# Patient Record
Sex: FEMALE | Race: WHITE | NOT HISPANIC OR LATINO
[De-identification: names, ages, dates, MRNs, and addresses within clinical notes are randomized per-mention and may not be internally consistent; named-entity substitution may affect disease eponyms.]

---

## 2017-11-16 ENCOUNTER — HOSPITAL (OUTPATIENT)
Dept: OTHER | Age: 62
End: 2017-11-16

## 2017-11-16 ENCOUNTER — IMAGING SERVICES (OUTPATIENT)
Dept: OTHER | Age: 62
End: 2017-11-16

## 2017-12-30 ENCOUNTER — HOSPITAL (OUTPATIENT)
Dept: OTHER | Age: 62
End: 2017-12-30
Attending: ORTHOPAEDIC SURGERY

## 2017-12-30 LAB
ANION GAP SERPL CALC-SCNC: 11 MMOL/L (ref 10–20)
BUN SERPL-MCNC: 14 MG/DL (ref 6–20)
BUN/CREAT SERPL: 16 (ref 7–25)
CALCIUM SERPL-MCNC: 9 MG/DL (ref 8.4–10.2)
CHLORIDE: 105 MMOL/L (ref 98–107)
CO2 SERPL-SCNC: 27 MMOL/L (ref 21–32)
CREAT SERPL-MCNC: 0.88 MG/DL (ref 0.51–0.95)
GLUCOSE SERPL-MCNC: 109 MG/DL (ref 65–99)
POTASSIUM SERPL-SCNC: 4 MMOL/L (ref 3.4–5.1)
SODIUM SERPL-SCNC: 139 MMOL/L (ref 135–145)

## 2017-12-31 ENCOUNTER — DIAGNOSTIC TRANS (OUTPATIENT)
Dept: OTHER | Age: 62
End: 2017-12-31

## 2018-01-04 ENCOUNTER — HOSPITAL (OUTPATIENT)
Dept: OTHER | Age: 63
End: 2018-01-04
Attending: ORTHOPAEDIC SURGERY

## 2018-01-04 ENCOUNTER — CHARTING TRANS (OUTPATIENT)
Dept: OTHER | Age: 63
End: 2018-01-04

## 2018-01-24 ENCOUNTER — HOSPITAL (OUTPATIENT)
Dept: OTHER | Age: 63
End: 2018-01-24
Attending: PHYSICIAN ASSISTANT

## 2018-02-01 ENCOUNTER — HOSPITAL (OUTPATIENT)
Dept: OTHER | Age: 63
End: 2018-02-01
Attending: PHYSICIAN ASSISTANT

## 2019-03-19 PROBLEM — G47.30 SLEEP APNEA, UNSPECIFIED TYPE: Status: ACTIVE | Noted: 2019-01-23

## 2019-03-19 PROBLEM — E78.5 HYPERLIPIDEMIA, UNSPECIFIED HYPERLIPIDEMIA TYPE: Status: ACTIVE | Noted: 2018-10-24

## 2019-03-19 PROBLEM — F17.200 SMOKER: Status: ACTIVE | Noted: 2019-01-23

## 2019-03-19 PROBLEM — I10 HYPERTENSION, UNSPECIFIED TYPE: Status: ACTIVE | Noted: 2018-10-24

## 2019-04-08 PROBLEM — H65.91 RIGHT OTITIS MEDIA WITH EFFUSION: Status: ACTIVE | Noted: 2019-04-08

## 2019-04-08 PROBLEM — H93.299 ABNORMAL AUDITORY PERCEPTION, UNSPECIFIED LATERALITY: Status: ACTIVE | Noted: 2019-04-08

## 2019-05-03 RX ORDER — AMOXICILLIN 500 MG/1
500 TABLET, FILM COATED ORAL 3 TIMES DAILY
Status: ON HOLD | COMMUNITY
End: 2019-05-10 | Stop reason: ALTCHOICE

## 2019-05-09 ENCOUNTER — HOSPITAL ENCOUNTER (OUTPATIENT)
Dept: CT IMAGING | Facility: HOSPITAL | Age: 64
Discharge: HOME | End: 2019-05-09
Attending: INTERNAL MEDICINE
Payer: COMMERCIAL

## 2019-05-09 ENCOUNTER — APPOINTMENT (OUTPATIENT)
Dept: LAB | Facility: HOSPITAL | Age: 64
End: 2019-05-09
Attending: RADIOLOGY
Payer: COMMERCIAL

## 2019-05-09 ENCOUNTER — HOSPITAL ENCOUNTER (OUTPATIENT)
Dept: GENERAL RADIOLOGY | Facility: HOSPITAL | Age: 64
Discharge: HOME | End: 2019-05-09
Attending: RADIOLOGY
Payer: COMMERCIAL

## 2019-05-09 VITALS
OXYGEN SATURATION: 93 % | SYSTOLIC BLOOD PRESSURE: 154 MMHG | DIASTOLIC BLOOD PRESSURE: 52 MMHG | BODY MASS INDEX: 33.13 KG/M2 | WEIGHT: 180 LBS | HEIGHT: 62 IN | HEART RATE: 55 BPM | TEMPERATURE: 98 F | RESPIRATION RATE: 16 BRPM

## 2019-05-09 DIAGNOSIS — R91.8 LUNG MASS: ICD-10-CM

## 2019-05-09 DIAGNOSIS — R91.8 LUNG MASS: Primary | ICD-10-CM

## 2019-05-09 PROCEDURE — 88342 IMHCHEM/IMCYTCHM 1ST ANTB: CPT | Performed by: INTERNAL MEDICINE

## 2019-05-09 PROCEDURE — 88305 TISSUE EXAM BY PATHOLOGIST: CPT | Performed by: INTERNAL MEDICINE

## 2019-05-09 PROCEDURE — 77012 CT SCAN FOR NEEDLE BIOPSY: CPT | Performed by: INTERNAL MEDICINE

## 2019-05-09 PROCEDURE — 36415 COLL VENOUS BLD VENIPUNCTURE: CPT

## 2019-05-09 PROCEDURE — 88341 IMHCHEM/IMCYTCHM EA ADD ANTB: CPT | Performed by: INTERNAL MEDICINE

## 2019-05-09 PROCEDURE — 32405 CT BIOPSY LUNG OR MEDIASTINUM (CPT=77012/32405): CPT | Performed by: INTERNAL MEDICINE

## 2019-05-09 PROCEDURE — 85027 COMPLETE CBC AUTOMATED: CPT

## 2019-05-09 PROCEDURE — 99152 MOD SED SAME PHYS/QHP 5/>YRS: CPT | Performed by: INTERNAL MEDICINE

## 2019-05-09 PROCEDURE — 85610 PROTHROMBIN TIME: CPT

## 2019-05-09 PROCEDURE — 71045 X-RAY EXAM CHEST 1 VIEW: CPT | Performed by: RADIOLOGY

## 2019-05-09 RX ORDER — NALOXONE HYDROCHLORIDE 0.4 MG/ML
80 INJECTION, SOLUTION INTRAMUSCULAR; INTRAVENOUS; SUBCUTANEOUS AS NEEDED
Status: DISCONTINUED | OUTPATIENT
Start: 2019-05-09 | End: 2019-05-11

## 2019-05-09 RX ORDER — FLUMAZENIL 0.1 MG/ML
0.2 INJECTION, SOLUTION INTRAVENOUS AS NEEDED
Status: DISCONTINUED | OUTPATIENT
Start: 2019-05-09 | End: 2019-05-11

## 2019-05-09 RX ORDER — MIDAZOLAM HYDROCHLORIDE 1 MG/ML
INJECTION INTRAMUSCULAR; INTRAVENOUS
Status: COMPLETED
Start: 2019-05-09 | End: 2019-05-09

## 2019-05-09 RX ORDER — SODIUM CHLORIDE 9 MG/ML
INJECTION, SOLUTION INTRAVENOUS CONTINUOUS
Status: DISCONTINUED | OUTPATIENT
Start: 2019-05-09 | End: 2019-05-11

## 2019-05-09 RX ORDER — MIDAZOLAM HYDROCHLORIDE 1 MG/ML
1 INJECTION INTRAMUSCULAR; INTRAVENOUS EVERY 5 MIN PRN
Status: ACTIVE | OUTPATIENT
Start: 2019-05-09 | End: 2019-05-09

## 2019-05-09 RX ADMIN — SODIUM CHLORIDE: 9 INJECTION, SOLUTION INTRAVENOUS at 08:05:00

## 2019-05-09 RX ADMIN — MIDAZOLAM HYDROCHLORIDE 1 MG: 1 INJECTION INTRAMUSCULAR; INTRAVENOUS at 08:21:00

## 2019-05-09 RX ADMIN — MIDAZOLAM HYDROCHLORIDE 0.5 MG: 1 INJECTION INTRAMUSCULAR; INTRAVENOUS at 08:25:00

## 2019-05-09 NOTE — OR NURSING
Dr. Christina Lange updated on patients xray results. Notified Dr. Christina Lange patient feels ok, but patient remains on 2L O2 at this time, O2 sats 91-92%. Per Dr. Christina Lange, patient to be weaned off oxygen, and then update Dr. Christina Lange and will decide on plan at that time.  Patient updated,

## 2019-05-09 NOTE — IMAGING NOTE
Post lung bx with Dr. Yue Pham. Tolerated procedure and sedation well. Coughing immediately after with small amt of blood noted. Dr. Yue Pham ordered pt to be prone on cart immediately after dressing bx site with tegaderm. Teg CDI. Pt positioned prone.  Alert and orien

## 2019-05-10 ENCOUNTER — APPOINTMENT (OUTPATIENT)
Dept: GENERAL RADIOLOGY | Facility: HOSPITAL | Age: 64
DRG: 201 | End: 2019-05-10
Attending: RADIOLOGY
Payer: COMMERCIAL

## 2019-05-10 ENCOUNTER — HOSPITAL ENCOUNTER (OUTPATIENT)
Dept: GENERAL RADIOLOGY | Facility: HOSPITAL | Age: 64
Discharge: HOME | DRG: 201 | End: 2019-05-10
Attending: RADIOLOGY
Payer: COMMERCIAL

## 2019-05-10 ENCOUNTER — HOSPITAL ENCOUNTER (INPATIENT)
Dept: CT IMAGING | Facility: HOSPITAL | Age: 64
LOS: 1 days | Discharge: HOME | DRG: 201 | End: 2019-05-11
Attending: INTERNAL MEDICINE | Admitting: INTERNAL MEDICINE
Payer: COMMERCIAL

## 2019-05-10 DIAGNOSIS — R91.8 MASS OF LUNG: ICD-10-CM

## 2019-05-10 DIAGNOSIS — J95.811 POSTPROCEDURAL PNEUMOTHORAX: ICD-10-CM

## 2019-05-10 DIAGNOSIS — R91.8 LUNG MASS: ICD-10-CM

## 2019-05-10 PROBLEM — J93.9 PNEUMOTHORAX: Status: ACTIVE | Noted: 2019-05-10

## 2019-05-10 PROCEDURE — 32557 INSERT CATH PLEURA W/ IMAGE: CPT | Performed by: INTERNAL MEDICINE

## 2019-05-10 PROCEDURE — 0W9930Z DRAINAGE OF RIGHT PLEURAL CAVITY WITH DRAINAGE DEVICE, PERCUTANEOUS APPROACH: ICD-10-PCS | Performed by: INTERNAL MEDICINE

## 2019-05-10 PROCEDURE — 94660 CPAP INITIATION&MGMT: CPT

## 2019-05-10 PROCEDURE — 99152 MOD SED SAME PHYS/QHP 5/>YRS: CPT | Performed by: INTERNAL MEDICINE

## 2019-05-10 PROCEDURE — 71046 X-RAY EXAM CHEST 2 VIEWS: CPT | Performed by: RADIOLOGY

## 2019-05-10 PROCEDURE — 71045 X-RAY EXAM CHEST 1 VIEW: CPT | Performed by: RADIOLOGY

## 2019-05-10 PROCEDURE — 5A09357 ASSISTANCE WITH RESPIRATORY VENTILATION, LESS THAN 24 CONSECUTIVE HOURS, CONTINUOUS POSITIVE AIRWAY PRESSURE: ICD-10-PCS | Performed by: RADIOLOGY

## 2019-05-10 RX ORDER — LOSARTAN POTASSIUM 100 MG/1
100 TABLET ORAL DAILY
Status: DISCONTINUED | OUTPATIENT
Start: 2019-05-10 | End: 2019-05-10

## 2019-05-10 RX ORDER — FLUTICASONE PROPIONATE 50 MCG
2 SPRAY, SUSPENSION (ML) NASAL DAILY
Status: DISCONTINUED | OUTPATIENT
Start: 2019-05-10 | End: 2019-05-11

## 2019-05-10 RX ORDER — NALOXONE HYDROCHLORIDE 0.4 MG/ML
80 INJECTION, SOLUTION INTRAMUSCULAR; INTRAVENOUS; SUBCUTANEOUS AS NEEDED
Status: DISCONTINUED | OUTPATIENT
Start: 2019-05-10 | End: 2019-05-10 | Stop reason: HOSPADM

## 2019-05-10 RX ORDER — AMLODIPINE BESYLATE 5 MG/1
10 TABLET ORAL DAILY
Status: DISCONTINUED | OUTPATIENT
Start: 2019-05-11 | End: 2019-05-11

## 2019-05-10 RX ORDER — ACETAMINOPHEN 325 MG/1
650 TABLET ORAL EVERY 6 HOURS PRN
Status: DISCONTINUED | OUTPATIENT
Start: 2019-05-10 | End: 2019-05-11

## 2019-05-10 RX ORDER — FLUMAZENIL 0.1 MG/ML
0.2 INJECTION, SOLUTION INTRAVENOUS AS NEEDED
Status: DISCONTINUED | OUTPATIENT
Start: 2019-05-10 | End: 2019-05-10 | Stop reason: HOSPADM

## 2019-05-10 RX ORDER — HYDROCODONE BITARTRATE AND ACETAMINOPHEN 10; 325 MG/1; MG/1
2 TABLET ORAL EVERY 4 HOURS PRN
Status: DISCONTINUED | OUTPATIENT
Start: 2019-05-10 | End: 2019-05-11

## 2019-05-10 RX ORDER — HYDROMORPHONE HYDROCHLORIDE 1 MG/ML
INJECTION, SOLUTION INTRAMUSCULAR; INTRAVENOUS; SUBCUTANEOUS EVERY 2 HOUR PRN
Status: DISCONTINUED | OUTPATIENT
Start: 2019-05-10 | End: 2019-05-11

## 2019-05-10 RX ORDER — SODIUM CHLORIDE 9 MG/ML
INJECTION, SOLUTION INTRAVENOUS CONTINUOUS
Status: DISCONTINUED | OUTPATIENT
Start: 2019-05-10 | End: 2019-05-10 | Stop reason: HOSPADM

## 2019-05-10 RX ORDER — ATORVASTATIN CALCIUM 20 MG/1
20 TABLET, FILM COATED ORAL NIGHTLY
Status: DISCONTINUED | OUTPATIENT
Start: 2019-05-10 | End: 2019-05-11

## 2019-05-10 RX ORDER — METOPROLOL TARTRATE 50 MG/1
50 TABLET, FILM COATED ORAL
Status: DISCONTINUED | OUTPATIENT
Start: 2019-05-10 | End: 2019-05-11

## 2019-05-10 RX ORDER — METOCLOPRAMIDE HYDROCHLORIDE 5 MG/ML
10 INJECTION INTRAMUSCULAR; INTRAVENOUS EVERY 8 HOURS PRN
Status: DISCONTINUED | OUTPATIENT
Start: 2019-05-10 | End: 2019-05-11

## 2019-05-10 RX ORDER — POLYETHYLENE GLYCOL 3350 17 G/17G
17 POWDER, FOR SOLUTION ORAL DAILY PRN
Status: DISCONTINUED | OUTPATIENT
Start: 2019-05-10 | End: 2019-05-11

## 2019-05-10 RX ORDER — SODIUM PHOSPHATE, DIBASIC AND SODIUM PHOSPHATE, MONOBASIC 7; 19 G/133ML; G/133ML
1 ENEMA RECTAL ONCE AS NEEDED
Status: DISCONTINUED | OUTPATIENT
Start: 2019-05-10 | End: 2019-05-11

## 2019-05-10 RX ORDER — AMLODIPINE BESYLATE 5 MG/1
10 TABLET ORAL DAILY
Status: DISCONTINUED | OUTPATIENT
Start: 2019-05-10 | End: 2019-05-10

## 2019-05-10 RX ORDER — BISACODYL 10 MG
10 SUPPOSITORY, RECTAL RECTAL
Status: DISCONTINUED | OUTPATIENT
Start: 2019-05-10 | End: 2019-05-11

## 2019-05-10 RX ORDER — HYDROCODONE BITARTRATE AND ACETAMINOPHEN 10; 325 MG/1; MG/1
1 TABLET ORAL EVERY 4 HOURS PRN
Status: DISCONTINUED | OUTPATIENT
Start: 2019-05-10 | End: 2019-05-11

## 2019-05-10 RX ORDER — LOSARTAN POTASSIUM 100 MG/1
100 TABLET ORAL DAILY
Status: DISCONTINUED | OUTPATIENT
Start: 2019-05-11 | End: 2019-05-11

## 2019-05-10 RX ORDER — MIDAZOLAM HYDROCHLORIDE 1 MG/ML
1 INJECTION INTRAMUSCULAR; INTRAVENOUS EVERY 5 MIN PRN
Status: DISCONTINUED | OUTPATIENT
Start: 2019-05-10 | End: 2019-05-10 | Stop reason: HOSPADM

## 2019-05-10 RX ORDER — ONDANSETRON 2 MG/ML
4 INJECTION INTRAMUSCULAR; INTRAVENOUS EVERY 6 HOURS PRN
Status: DISCONTINUED | OUTPATIENT
Start: 2019-05-10 | End: 2019-05-11

## 2019-05-10 RX ADMIN — FLUTICASONE PROPIONATE 2 SPRAY: 50 MCG SPRAY, SUSPENSION (ML) NASAL at 20:23:00

## 2019-05-10 RX ADMIN — ATORVASTATIN CALCIUM 20 MG: 20 TABLET, FILM COATED ORAL at 20:22:00

## 2019-05-10 RX ADMIN — HYDROMORPHONE HYDROCHLORIDE 1 MG: 1 INJECTION, SOLUTION INTRAMUSCULAR; INTRAVENOUS; SUBCUTANEOUS at 10:29:00

## 2019-05-10 RX ADMIN — HYDROCODONE BITARTRATE AND ACETAMINOPHEN 2 TABLET: 10; 325 TABLET ORAL at 20:23:00

## 2019-05-10 NOTE — H&P
[facility] Hospitalist H&P       CC: direct admission for R PTX with chest tube    PCP: Rochelle Jackson MD    History of Present Illness:   Pt seen earlier, note delayed.     Pt is a 60 yo with HTN/HL, ALEJANDRO, incidental lung nodule s/p bx who is admitted fo No wheezing, Normal effort   Chest wall:  No tenderness or deformity. Heart:  Regular rate and rhythm, S1, S2 normal,no LE edema   Abdomen:   Soft, non-tender.  Bowel sounds normal. . Non distended, no overt hernias   Extremities: Extremities normal, at

## 2019-05-10 NOTE — CONSULTS
Pulmonary H&P/Consult       NAME: Eloina Bradford - ROOM: [facility] - MRN: [id number] - Age: 61year old - :  1955    Date of Admission: 5/10/2019  9:46 AM  Admission Diagnosis: Mass of lung [R91.8]  Postprocedural pneumothorax [J95.811]  Reason Tab TK 1 T PO QD Disp:  Rfl: 4 5/9/2019 at Unknown time   losartan 100 MG Oral Tab Take by mouth.  Disp:  Rfl:  5/9/2019 at Unknown time     No Known Allergies    Social History:  Social History    Socioeconomic History      Marital status: Single      Spou RM1:  Fluticasone Propionate 50 MCG/ACT Nasal Suspension 2 sprays by Each Nare route daily.  Disp: 1 Bottle Rfl: 3   SERTRALINE HCL 50 MG Oral Tab TAKE 1 TABLET BY MOUTH EVERY DAY Disp: 30 tablet Rfl: 0   Metoprolol Tartrate 50 MG Oral Tab Take 50 mg by jerrell (36.6 °C) (Oral)   Resp 18   SpO2 91%     General Appearance:    Alert, cooperative, no distress, appears stated age   Head:    Normocephalic, without obvious abnormality, atraumatic   Eyes:    PERRL, conjunctiva/corneas clear, EOM's intact, both eyes   Th biopsy  -s/p chest tube  -IR following  2. Emphysema  -mild  -BD protocol  3. ALEJANDRO  -hold on CPAP if pt stays overnight as it may worsen the pneumothorax  4.  Lung Nodule  -await path                   Jada Dillon [facility] Pulmonary and Critical Care

## 2019-05-10 NOTE — PROCEDURES
Inc rt ptx after lung bx yesterday. 8F pigtail placed. Ptx aspirated. Comp-none. D/w Rocael Mcclellan and Arnaud.

## 2019-05-10 NOTE — IMAGING NOTE
Pt reports tolerable pain at the catheter site/rt chest.    Serial CXRs w/ decreasing small rt apical ptx, now 4-5 mm. Minimal air leak at bedside only w/ strong coughing.   CPM.  Hopefully will be able to clamp tube in am and if lung remains expanded, rem

## 2019-05-10 NOTE — PROGRESS NOTES
NURSING ADMISSION NOTE      Patient admitted via Cart  Oriented to room. Safety precautions initiated. Bed in low position. Call light in reach. Patient arrived to room 503. 2L nasal canula applied, saturation 94%.  Right sided, small bore chest t

## 2019-05-10 NOTE — IMAGING NOTE
Report called to Michael Rajan RN for pulmonary floor room 503. VSS on 2L O2 via NC. Pt tolerated procedure well.  Chest tube with dressing CDI to right upper chest. Presently pt c/o 10/10 pain and additional 50mcg fentanyl IVP administered via verbal order from

## 2019-05-11 ENCOUNTER — APPOINTMENT (OUTPATIENT)
Dept: GENERAL RADIOLOGY | Facility: HOSPITAL | Age: 64
DRG: 201 | End: 2019-05-11
Attending: RADIOLOGY
Payer: COMMERCIAL

## 2019-05-11 ENCOUNTER — APPOINTMENT (OUTPATIENT)
Dept: GENERAL RADIOLOGY | Facility: HOSPITAL | Age: 64
DRG: 201 | End: 2019-05-11
Attending: INTERNAL MEDICINE
Payer: COMMERCIAL

## 2019-05-11 VITALS
BODY MASS INDEX: 33 KG/M2 | WEIGHT: 178.63 LBS | SYSTOLIC BLOOD PRESSURE: 156 MMHG | HEART RATE: 45 BPM | TEMPERATURE: 99 F | OXYGEN SATURATION: 94 % | RESPIRATION RATE: 18 BRPM | DIASTOLIC BLOOD PRESSURE: 44 MMHG

## 2019-05-11 PROBLEM — Z99.89 OSA ON CPAP: Status: ACTIVE | Noted: 2019-01-23

## 2019-05-11 PROBLEM — G47.33 OSA ON CPAP: Status: ACTIVE | Noted: 2019-01-23

## 2019-05-11 PROCEDURE — 71045 X-RAY EXAM CHEST 1 VIEW: CPT | Performed by: RADIOLOGY

## 2019-05-11 PROCEDURE — 80048 BASIC METABOLIC PNL TOTAL CA: CPT | Performed by: HOSPITALIST

## 2019-05-11 PROCEDURE — 85025 COMPLETE CBC W/AUTO DIFF WBC: CPT | Performed by: HOSPITALIST

## 2019-05-11 PROCEDURE — 83735 ASSAY OF MAGNESIUM: CPT | Performed by: HOSPITALIST

## 2019-05-11 PROCEDURE — 71045 X-RAY EXAM CHEST 1 VIEW: CPT | Performed by: INTERNAL MEDICINE

## 2019-05-11 RX ADMIN — HYDROCODONE BITARTRATE AND ACETAMINOPHEN 2 TABLET: 10; 325 TABLET ORAL at 05:20:00

## 2019-05-11 RX ADMIN — METOPROLOL TARTRATE 50 MG: 50 TABLET, FILM COATED ORAL at 05:19:00

## 2019-05-11 NOTE — DISCHARGE SUMMARY
General Medicine Discharge Summary     Patient ID:  Essie Barnes year old  11/24/1955    Admit date: 5/10/2019    Discharge date and time: 5/11/19    Attending Physician: Yadira Merritt MD     Primary Care Physician: Jamel Reid MD     Re 5/10/2019. Result read back was performed. Dictated by: Bedelia Claude, MD on 5/10/2019 at 7:48     Approved by: Bedelia Claude, MD            Pet/ct Standard Body Scan (oncology) ([id number]=[id number])    Result Date: 5/1/2019  IMPRESSION: 1.  Right middle 5/10/2019  CONCLUSION:  Slight interval decrease in size of the right apical pneumothorax. The results were discussed with Dr. Armando Clark at [phone] hr on 5/10/2019.     Dictated by: Elda Randhawa MD on 5/10/2019 at 16:02     Approved by: Elda Randhawa MD 3:30 PM Jean Champion MD [facility] [address]       No orders of the defined types were placed in this encounter.       Follow-up with the following labs/studies: N/A      Total Time Coordinating Care: Greater than 30 minutes    Patient had opportunity to

## 2019-05-11 NOTE — IMAGING NOTE
Pt w/ no c/o's. Wants to go home. Lung remains re expanded after CT clamping. CT removed at bedside. CXR pending. If CXR is ok, pt can be d/c'ed. D/w Tino Mcqueen RN.

## 2019-05-11 NOTE — PROGRESS NOTES
PT bp slightly elevated this morning, 174/55. Scheduled metoprolol given, bp rechek WNL, 158/46. wctm.

## 2019-05-11 NOTE — PLAN OF CARE
Problem: Patient/Family Goals  Goal: Patient/Family Long Term Goal  Description  Patient's Long Term Goal: go home    Interventions:  - chest tube management per IR.  - See additional Care Plan goals for specific interventions   Outcome: Radha Taveras Respiratory Therapy support as indicated  - Manage/alleviate anxiety  - Monitor for signs/symptoms of CO2 retention  Outcome: Progressing     Patient is AO x 4. Maintains O2 sats on 2 LPM, will wean as tolerated to baseline of room air.  Denies pain/discomf

## 2019-05-11 NOTE — PLAN OF CARE
NURSING DISCHARGE NOTE    Discharged Home via Ambulatory. Accompanied by RN  Belongings Taken by patient/family. Discharge navigator complete. Discharge instructions reviewed with patient, all questions & concerns addressed.    Patient instructed to

## 2019-05-11 NOTE — PLAN OF CARE
PROBLEM: Pneumothorax    ASSESSMENT: Pt is A&OX4. VSS, afebrile. Maintaining O2 sats >94% on 2.5L, ALEJANDRO holding CPAP at [address] Salvador per MD. RA is baseline. . Right sided small bore chest tube to (-20) suction, no air leak noted, no subcutaneous emphysema noted. Darcy Servin oxygenation  Description  INTERVENTIONS:  - Assess for changes in respiratory status  - Assess for changes in mentation and behavior  - Position to facilitate oxygenation and minimize respiratory effort  - Oxygen supplementation based on oxygen saturation

## 2019-05-11 NOTE — PROGRESS NOTES
[address] Patient Status:  Inpatient    1955 MRN [id number]   [facility] 5NW-A Attending Soha Joseph MD   Hosp Day # 1 PCP Tanner Gomez MD     Pulm / Critical Care Progress Note     S: ct removed Creatinine (mg/dL)   Date Value   05/11/2019 0.92   ]    No results for input(s): ALKPHOS, ALT, AST in the last 168 hours. Invalid input(s): BILITOT, BILIDIR, PROT, LABALBU      Imaging: cxr: no ptx post 1 hour of clamping     ASSESSMENT/PLAN:    1.

## 2019-05-11 NOTE — PLAN OF CARE
Per Dr. Melville Bosworth, clamp chest tube for one hour and obtain repeat portable CXR. Chest tube clamped at 0815.

## 2019-05-11 NOTE — RESPIRATORY THERAPY NOTE
ALEJANDRO : EQUIPMENT USE: DAILY SUMMARY                                            SET MODE: AUTO CPAP WITH CFLEX                                          USAGE IN HOURS: 7:06                                          90

## 2019-05-14 NOTE — PROGRESS NOTES
Spoke to patient she had not gotten results from the pulmonologist yet, had her speak with Dr. Andrae Miranda who gave her the biopsy results.

## 2019-11-21 PROBLEM — J93.9 PNEUMOTHORAX: Status: RESOLVED | Noted: 2019-05-10 | Resolved: 2019-11-21

## 2019-11-21 PROBLEM — H65.91 RIGHT OTITIS MEDIA WITH EFFUSION: Status: RESOLVED | Noted: 2019-04-08 | Resolved: 2019-11-21

## 2019-11-21 PROBLEM — Z85.3 HISTORY OF BILATERAL BREAST CANCER: Status: ACTIVE | Noted: 2019-11-21

## 2020-05-23 PROBLEM — Z91.89 AT RISK FOR HEART DISEASE: Status: ACTIVE | Noted: 2020-05-23

## 2020-08-05 PROBLEM — H93.299 ABNORMAL AUDITORY PERCEPTION, UNSPECIFIED LATERALITY: Status: ACTIVE | Noted: 2020-08-05
